# Patient Record
(demographics unavailable — no encounter records)

---

## 2025-04-04 NOTE — HISTORY OF PRESENT ILLNESS
[Y] : Patient is sexually active [N] : Patient denies prior pregnancies [Menarche Age: ____] : age at menarche was [unfilled] [No] : Patient does not have concerns regarding sex [Currently Active] : currently active [Men] : men [PapSmeardate] : 1/30/2024 [TextBox_31] : LSIL [HIVDate] : 11/15/2022 [TextBox_53] : NEG [SyphilisDate] : 11/15/2022 [TextBox_58] : NEG [GonorrheaDate] : 11/15/2022 [TextBox_63] : NEG [ChlamydiaDate] : 11/15/2022 [TextBox_68] : NEG [TextBox_78] : NEG [HPVDate] : 11/15/2022 [HepatitisBDate] : 11/15/2022 [TextBox_83] : NEG [HepatitisCDate] : 11/15/2022 [TextBox_88] : NEG [LMPDate] : 2/28/2025 [PGHxTotal] : 0 [FreeTextEntry1] : 2/28/2025

## 2025-04-04 NOTE — PLAN
[FreeTextEntry1] : Pap done today.  Pt declines BC. She is trying to get pregnant. I recommended her to start pre- vitamins.   Pre-conception bloodwork done today. Pt declines genetic screening.   Self-breast exam reviewed.  She will follow up annually and as needed.

## 2025-04-04 NOTE — END OF VISIT
[FreeTextEntry3] : "I, Colby Hunt, personally scribed the services dictated to me by Dr. Brenna Bella MD in this documentation on 04/01/2025"   "I, Dr. Brenna Bella MD, personally performed the services described in this documentation on 04/01/2025 for the patient as scribed by Colby Hunt in my presence. I have reviewed and verified that all the information is accurate and true."

## 2025-07-29 NOTE — PLAN
[FreeTextEntry1] : UPT was negative. Will draw HCG level.   Hormone panel also drawn to assess for hormonal causes that could be causing her irregular periods.   F/u annually or as needed.

## 2025-07-29 NOTE — HISTORY OF PRESENT ILLNESS
[Y] : Patient is sexually active [N] : Patient denies prior pregnancies [Menarche Age: ____] : age at menarche was [unfilled] [No] : Patient does not have concerns regarding sex [Currently Active] : currently active [Men] : men [PapSmeardate] : 04/01/2025 [TextBox_31] : NEG  [HIVDate] : 04/01/2025 [TextBox_53] : NEG  [SyphilisDate] : 04/01/2025 [TextBox_58] : NEG  [HPVDate] : 04/01/2025 [TextBox_78] : DETECTED  [HepatitisBDate] : 04/01/2025 [TextBox_83] : NEG  [HepatitisCDate] : 04/01/2025 [TextBox_88] : NEG  [LMPDate] : 05/2025 [PGHxTotal] : 0 [FreeTextEntry1] : 06/2025

## 2025-07-29 NOTE — END OF VISIT
[FreeTextEntry3] : I, Carlos Rao solely acted as scribe for Dr. Brenna Bella on 07/24/2025  All medical entries made by the Scribe were at my, Dr. Blake, direction and personally dictated by me on 07/24/2025 . I have reviewed the chart and agree that the record accurately reflects my personal performance of the history, physical exam, assessment and plan. I have also personally directed, reviewed, and agreed with the chart.